# Patient Record
(demographics unavailable — no encounter records)

---

## 2025-02-06 NOTE — ADDENDUM
[FreeTextEntry1] : This note was written by Petros Goldberg on 02/06/2025 acting as scribe for Dr. Prashanth Washington M.D.  I, Prashanth Washington MD, have read and attest that all the information, medical decision making and discharge instructions within are true and accurate.

## 2025-02-06 NOTE — HISTORY OF PRESENT ILLNESS
[Stable] : stable [de-identified] : 50 year old female presents for evaluation of lower back pain x 2-3 weeks. Last seen in Nov 2023 lower back pain. She notes that her pain occurs intermittently, especially with lifting items. Denies injury.  The pain is localized to the lower back and is mostly right sided.  Denies radiation of pain down the legs. Denies numbness/tingling. Denies weakness. Sitting aggravates the pain. She took advil for the pain.  Has not tried PT or chiropractic care. PMHx: denies significant medical history  No fever chills sweats nausea vomiting no bowel or bladder dysfunction, no recent weight loss or gain no night pain. This history is in addition to the intake form that I personally reviewed.

## 2025-02-06 NOTE — DISCUSSION/SUMMARY
[de-identified] : Right sacroiliitis. Discussed all options. Naproxen PRN. Lumbar MRI referral. F/U after MRI. All options discussed including rest, medicine, home exercise, acupuncture, Chiropractic care, Physical Therapy, Pain management, and last resort surgery. All questions were answered, all alternatives discussed and the patient is in complete agreement with the treatment plan which the patient contributed to and discussed with me through the shared decision making process. Follow-up appointment as instructed. Any issues and the patient will call or come in sooner.

## 2025-02-06 NOTE — PHYSICAL EXAM
[Normal] : Gait: normal [Anderson's Sign] : negative Anderson's sign [Pronator Drift] : negative pronator drift [SLR] : negative straight leg raise [de-identified] : 5 out of 5 motor strength, sensation is intact and symmetrical full range of motion flexion extension and rotation, no palpatory tenderness full range of motion of hips knees shoulders and elbows (all four extremities), no atrophy, negative straight leg raise, no pathological reflexes, no swelling, normal ambulation, no apparent distress skin is intact, no palpable lymph nodes, no upper or lower extremity instability, alert and oriented x3 and normal mood. Normal finger-to nose test. No upper motor neuron findings. Right SI joint pain. Restricted forward flexion. [de-identified] : XR AP Lat Lumbar 11/01/2023 -adequate- reviewed with patient.